# Patient Record
Sex: FEMALE | Race: WHITE | Employment: UNEMPLOYED | URBAN - METROPOLITAN AREA
[De-identification: names, ages, dates, MRNs, and addresses within clinical notes are randomized per-mention and may not be internally consistent; named-entity substitution may affect disease eponyms.]

---

## 2017-08-14 ENCOUNTER — TELEPHONE (OUTPATIENT)
Dept: OBGYN CLINIC | Age: 32
End: 2017-08-14

## 2017-08-15 ENCOUNTER — TELEPHONE (OUTPATIENT)
Dept: OBGYN CLINIC | Age: 32
End: 2017-08-15

## 2019-01-03 ENCOUNTER — ANESTHESIA (OUTPATIENT)
Dept: LABOR AND DELIVERY | Age: 34
End: 2019-01-03
Payer: COMMERCIAL

## 2019-01-03 ENCOUNTER — HOSPITAL ENCOUNTER (INPATIENT)
Age: 34
LOS: 1 days | Discharge: HOME | End: 2019-01-04
Attending: OBSTETRICS & GYNECOLOGY | Admitting: OBSTETRICS & GYNECOLOGY
Payer: COMMERCIAL

## 2019-01-03 ENCOUNTER — ANESTHESIA EVENT (OUTPATIENT)
Dept: LABOR AND DELIVERY | Age: 34
End: 2019-01-03
Payer: COMMERCIAL

## 2019-01-03 PROBLEM — Z37.9 NORMAL LABOR: Status: ACTIVE | Noted: 2019-01-03

## 2019-01-03 LAB
AMPHETAMINE SCREEN, URINE: NORMAL
BARBITURATE SCREEN URINE: NORMAL
BASOPHILS ABSOLUTE: 0.1 K/UL (ref 0–0.2)
BASOPHILS RELATIVE PERCENT: 0.5 %
BENZODIAZEPINE SCREEN, URINE: NORMAL
BUPRENORPHINE URINE: NORMAL
CANNABINOID SCREEN URINE: NORMAL
COCAINE METABOLITE SCREEN URINE: NORMAL
EOSINOPHILS ABSOLUTE: 0.1 K/UL (ref 0–0.6)
EOSINOPHILS RELATIVE PERCENT: 0.8 %
HCT VFR BLD CALC: 38.9 % (ref 36–48)
HEMOGLOBIN: 13.3 G/DL (ref 12–16)
LYMPHOCYTES ABSOLUTE: 2.6 K/UL (ref 1–5.1)
LYMPHOCYTES RELATIVE PERCENT: 17.8 %
Lab: NORMAL
MCH RBC QN AUTO: 31.7 PG (ref 26–34)
MCHC RBC AUTO-ENTMCNC: 34.3 G/DL (ref 31–36)
MCV RBC AUTO: 92.4 FL (ref 80–100)
METHADONE SCREEN, URINE: NORMAL
MONOCYTES ABSOLUTE: 0.8 K/UL (ref 0–1.3)
MONOCYTES RELATIVE PERCENT: 5.6 %
NEUTROPHILS ABSOLUTE: 10.9 K/UL (ref 1.7–7.7)
NEUTROPHILS RELATIVE PERCENT: 75.3 %
OPIATE SCREEN URINE: NORMAL
OXYCODONE URINE: NORMAL
PDW BLD-RTO: 13.2 % (ref 12.4–15.4)
PH UA: 7
PHENCYCLIDINE SCREEN URINE: NORMAL
PLATELET # BLD: 150 K/UL (ref 135–450)
PMV BLD AUTO: 9.4 FL (ref 5–10.5)
PROPOXYPHENE SCREEN: NORMAL
RBC # BLD: 4.22 M/UL (ref 4–5.2)
SPECIMEN STATUS: NORMAL
TOTAL SYPHILLIS IGG/IGM: NORMAL
WBC # BLD: 14.4 K/UL (ref 4–11)

## 2019-01-03 PROCEDURE — 51701 INSERT BLADDER CATHETER: CPT

## 2019-01-03 PROCEDURE — 1220000000 HC SEMI PRIVATE OB R&B

## 2019-01-03 PROCEDURE — 3700000025 ANESTHESIA EPIDURAL BLOCK: Performed by: ANESTHESIOLOGY

## 2019-01-03 PROCEDURE — 6370000000 HC RX 637 (ALT 250 FOR IP): Performed by: ADVANCED PRACTICE MIDWIFE

## 2019-01-03 PROCEDURE — 7200000001 HC VAGINAL DELIVERY

## 2019-01-03 PROCEDURE — 85025 COMPLETE CBC W/AUTO DIFF WBC: CPT

## 2019-01-03 PROCEDURE — 2580000003 HC RX 258: Performed by: ADVANCED PRACTICE MIDWIFE

## 2019-01-03 PROCEDURE — 2500000003 HC RX 250 WO HCPCS: Performed by: NURSE ANESTHETIST, CERTIFIED REGISTERED

## 2019-01-03 PROCEDURE — 86780 TREPONEMA PALLIDUM: CPT

## 2019-01-03 PROCEDURE — 36592 COLLECT BLOOD FROM PICC: CPT

## 2019-01-03 PROCEDURE — 80307 DRUG TEST PRSMV CHEM ANLYZR: CPT

## 2019-01-03 RX ORDER — LANOLIN 100 %
OINTMENT (GRAM) TOPICAL PRN
Status: DISCONTINUED | OUTPATIENT
Start: 2019-01-03 | End: 2019-01-04 | Stop reason: HOSPADM

## 2019-01-03 RX ORDER — DOCUSATE SODIUM 100 MG/1
100 CAPSULE, LIQUID FILLED ORAL 2 TIMES DAILY
Status: DISCONTINUED | OUTPATIENT
Start: 2019-01-03 | End: 2019-01-04 | Stop reason: HOSPADM

## 2019-01-03 RX ORDER — ACETAMINOPHEN 325 MG/1
650 TABLET ORAL EVERY 4 HOURS PRN
Status: DISCONTINUED | OUTPATIENT
Start: 2019-01-03 | End: 2019-01-04 | Stop reason: HOSPADM

## 2019-01-03 RX ORDER — SODIUM CHLORIDE, SODIUM LACTATE, POTASSIUM CHLORIDE, CALCIUM CHLORIDE 600; 310; 30; 20 MG/100ML; MG/100ML; MG/100ML; MG/100ML
INJECTION, SOLUTION INTRAVENOUS CONTINUOUS
Status: DISCONTINUED | OUTPATIENT
Start: 2019-01-03 | End: 2019-01-04 | Stop reason: HOSPADM

## 2019-01-03 RX ORDER — SODIUM CHLORIDE 0.9 % (FLUSH) 0.9 %
10 SYRINGE (ML) INJECTION PRN
Status: DISCONTINUED | OUTPATIENT
Start: 2019-01-03 | End: 2019-01-03 | Stop reason: SDUPTHER

## 2019-01-03 RX ORDER — HYDROCODONE BITARTRATE AND ACETAMINOPHEN 5; 325 MG/1; MG/1
1 TABLET ORAL EVERY 4 HOURS PRN
Status: DISCONTINUED | OUTPATIENT
Start: 2019-01-03 | End: 2019-01-04 | Stop reason: HOSPADM

## 2019-01-03 RX ORDER — SODIUM CHLORIDE 0.9 % (FLUSH) 0.9 %
10 SYRINGE (ML) INJECTION EVERY 12 HOURS SCHEDULED
Status: DISCONTINUED | OUTPATIENT
Start: 2019-01-03 | End: 2019-01-04 | Stop reason: HOSPADM

## 2019-01-03 RX ORDER — DOCUSATE SODIUM 100 MG/1
100 CAPSULE, LIQUID FILLED ORAL 2 TIMES DAILY
Status: DISCONTINUED | OUTPATIENT
Start: 2019-01-03 | End: 2019-01-03 | Stop reason: SDUPTHER

## 2019-01-03 RX ORDER — IBUPROFEN 800 MG/1
800 TABLET ORAL EVERY 8 HOURS
Status: DISCONTINUED | OUTPATIENT
Start: 2019-01-03 | End: 2019-01-04 | Stop reason: HOSPADM

## 2019-01-03 RX ORDER — BUPIVACAINE HYDROCHLORIDE 2.5 MG/ML
INJECTION, SOLUTION EPIDURAL; INFILTRATION; INTRACAUDAL PRN
Status: DISCONTINUED | OUTPATIENT
Start: 2019-01-03 | End: 2019-01-03 | Stop reason: SDUPTHER

## 2019-01-03 RX ORDER — SODIUM CHLORIDE, SODIUM LACTATE, POTASSIUM CHLORIDE, CALCIUM CHLORIDE 600; 310; 30; 20 MG/100ML; MG/100ML; MG/100ML; MG/100ML
INJECTION, SOLUTION INTRAVENOUS CONTINUOUS
Status: DISCONTINUED | OUTPATIENT
Start: 2019-01-03 | End: 2019-01-03 | Stop reason: SDUPTHER

## 2019-01-03 RX ORDER — SODIUM CHLORIDE 0.9 % (FLUSH) 0.9 %
10 SYRINGE (ML) INJECTION EVERY 12 HOURS SCHEDULED
Status: DISCONTINUED | OUTPATIENT
Start: 2019-01-03 | End: 2019-01-03 | Stop reason: SDUPTHER

## 2019-01-03 RX ORDER — ONDANSETRON 2 MG/ML
4 INJECTION INTRAMUSCULAR; INTRAVENOUS EVERY 6 HOURS PRN
Status: DISCONTINUED | OUTPATIENT
Start: 2019-01-03 | End: 2019-01-04 | Stop reason: HOSPADM

## 2019-01-03 RX ORDER — SODIUM CHLORIDE, SODIUM LACTATE, POTASSIUM CHLORIDE, CALCIUM CHLORIDE 600; 310; 30; 20 MG/100ML; MG/100ML; MG/100ML; MG/100ML
INJECTION, SOLUTION INTRAVENOUS
Status: DISPENSED
Start: 2019-01-03 | End: 2019-01-03

## 2019-01-03 RX ORDER — SODIUM CHLORIDE 0.9 % (FLUSH) 0.9 %
10 SYRINGE (ML) INJECTION PRN
Status: DISCONTINUED | OUTPATIENT
Start: 2019-01-03 | End: 2019-01-04 | Stop reason: HOSPADM

## 2019-01-03 RX ADMIN — DOCUSATE SODIUM 100 MG: 100 TABLET, FILM COATED ORAL at 07:58

## 2019-01-03 RX ADMIN — Medication 10 ML: at 20:59

## 2019-01-03 RX ADMIN — SODIUM CHLORIDE, POTASSIUM CHLORIDE, SODIUM LACTATE AND CALCIUM CHLORIDE: 600; 310; 30; 20 INJECTION, SOLUTION INTRAVENOUS at 04:03

## 2019-01-03 RX ADMIN — SODIUM CHLORIDE, POTASSIUM CHLORIDE, SODIUM LACTATE AND CALCIUM CHLORIDE: 600; 310; 30; 20 INJECTION, SOLUTION INTRAVENOUS at 04:30

## 2019-01-03 RX ADMIN — MOXIFLOXACIN HYDROCHLORIDE 800 MG: 400 TABLET, FILM COATED ORAL at 16:05

## 2019-01-03 RX ADMIN — MOXIFLOXACIN HYDROCHLORIDE 800 MG: 400 TABLET, FILM COATED ORAL at 07:59

## 2019-01-03 RX ADMIN — BUPIVACAINE HYDROCHLORIDE 5 ML: 2.5 INJECTION, SOLUTION EPIDURAL; INFILTRATION; INTRACAUDAL; PERINEURAL at 03:45

## 2019-01-03 RX ADMIN — DOCUSATE SODIUM 100 MG: 100 TABLET, FILM COATED ORAL at 20:58

## 2019-01-03 RX ADMIN — HYDROCODONE BITARTRATE AND ACETAMINOPHEN 1 TABLET: 5; 325 TABLET ORAL at 14:40

## 2019-01-03 RX ADMIN — Medication 15 ML/HR: at 03:48

## 2019-01-03 RX ADMIN — WITCH HAZEL: 500 SOLUTION RECTAL; TOPICAL at 07:59

## 2019-01-03 RX ADMIN — SODIUM CHLORIDE, POTASSIUM CHLORIDE, SODIUM LACTATE AND CALCIUM CHLORIDE: 600; 310; 30; 20 INJECTION, SOLUTION INTRAVENOUS at 03:15

## 2019-01-03 RX ADMIN — BENZOCAINE AND MENTHOL: 20; .5 SPRAY TOPICAL at 07:57

## 2019-01-03 ASSESSMENT — PAIN SCALES - GENERAL
PAINLEVEL_OUTOF10: 1
PAINLEVEL_OUTOF10: 4
PAINLEVEL_OUTOF10: 1

## 2019-01-03 ASSESSMENT — PAIN - FUNCTIONAL ASSESSMENT: PAIN_FUNCTIONAL_ASSESSMENT: 0-10

## 2019-01-04 VITALS
WEIGHT: 190 LBS | SYSTOLIC BLOOD PRESSURE: 108 MMHG | HEIGHT: 69 IN | DIASTOLIC BLOOD PRESSURE: 67 MMHG | TEMPERATURE: 98 F | RESPIRATION RATE: 18 BRPM | HEART RATE: 65 BPM | BODY MASS INDEX: 28.14 KG/M2

## 2019-01-04 PROBLEM — Z37.9 NORMAL LABOR: Status: RESOLVED | Noted: 2019-01-03 | Resolved: 2019-01-04

## 2019-01-04 LAB
HCT VFR BLD CALC: 39.1 % (ref 36–48)
HEMOGLOBIN: 13.1 G/DL (ref 12–16)
MCH RBC QN AUTO: 31.8 PG (ref 26–34)
MCHC RBC AUTO-ENTMCNC: 33.4 G/DL (ref 31–36)
MCV RBC AUTO: 95.1 FL (ref 80–100)
PDW BLD-RTO: 13.9 % (ref 12.4–15.4)
PLATELET # BLD: 136 K/UL (ref 135–450)
PMV BLD AUTO: 9.6 FL (ref 5–10.5)
RBC # BLD: 4.12 M/UL (ref 4–5.2)
WBC # BLD: 13.6 K/UL (ref 4–11)

## 2019-01-04 PROCEDURE — 6370000000 HC RX 637 (ALT 250 FOR IP): Performed by: ADVANCED PRACTICE MIDWIFE

## 2019-01-04 PROCEDURE — 36415 COLL VENOUS BLD VENIPUNCTURE: CPT

## 2019-01-04 PROCEDURE — 85027 COMPLETE CBC AUTOMATED: CPT

## 2019-01-04 RX ORDER — IBUPROFEN 800 MG/1
800 TABLET ORAL EVERY 8 HOURS
Refills: 3 | Status: ON HOLD | COMMUNITY
Start: 2019-01-04 | End: 2021-07-10

## 2019-01-04 RX ORDER — PSEUDOEPHEDRINE HCL 30 MG
100 TABLET ORAL 2 TIMES DAILY PRN
Status: ON HOLD | COMMUNITY
Start: 2019-01-04 | End: 2021-07-10

## 2019-01-04 RX ADMIN — MOXIFLOXACIN HYDROCHLORIDE 800 MG: 400 TABLET, FILM COATED ORAL at 00:05

## 2019-01-04 RX ADMIN — HYDROCODONE BITARTRATE AND ACETAMINOPHEN 1 TABLET: 5; 325 TABLET ORAL at 07:04

## 2019-01-04 RX ADMIN — DOCUSATE SODIUM 100 MG: 100 TABLET, FILM COATED ORAL at 08:46

## 2019-01-04 RX ADMIN — HYDROCODONE BITARTRATE AND ACETAMINOPHEN 1 TABLET: 5; 325 TABLET ORAL at 01:10

## 2019-01-04 RX ADMIN — MOXIFLOXACIN HYDROCHLORIDE 800 MG: 400 TABLET, FILM COATED ORAL at 08:45

## 2019-01-04 ASSESSMENT — PAIN SCALES - GENERAL
PAINLEVEL_OUTOF10: 6
PAINLEVEL_OUTOF10: 5
PAINLEVEL_OUTOF10: 3
PAINLEVEL_OUTOF10: 5

## 2020-12-09 LAB
C. TRACHOMATIS, EXTERNAL RESULT: NEGATIVE
N. GONORRHOEAE, EXTERNAL RESULT: NEGATIVE

## 2021-01-06 LAB
ABO, EXTERNAL RESULT: NORMAL
HEP B, EXTERNAL RESULT: NEGATIVE
HIV, EXTERNAL RESULT: NON REACTIVE
RH FACTOR, EXTERNAL RESULT: POSITIVE
RPR, EXTERNAL RESULT: NON REACTIVE
RUBELLA TITER, EXTERNAL RESULT: NORMAL

## 2021-07-08 LAB — GBS, EXTERNAL RESULT: NEGATIVE

## 2021-07-10 ENCOUNTER — HOSPITAL ENCOUNTER (OUTPATIENT)
Age: 36
Discharge: HOME | End: 2021-07-10
Attending: OBSTETRICS & GYNECOLOGY | Admitting: OBSTETRICS & GYNECOLOGY
Payer: COMMERCIAL

## 2021-07-10 VITALS
WEIGHT: 186 LBS | BODY MASS INDEX: 27.55 KG/M2 | SYSTOLIC BLOOD PRESSURE: 128 MMHG | HEIGHT: 69 IN | TEMPERATURE: 97.7 F | DIASTOLIC BLOOD PRESSURE: 74 MMHG | HEART RATE: 64 BPM | RESPIRATION RATE: 18 BRPM

## 2021-07-10 PROCEDURE — 99211 OFF/OP EST MAY X REQ PHY/QHP: CPT

## 2021-07-10 RX ORDER — ACETAMINOPHEN/DIPHENHYDRAMINE 500MG-25MG
2 TABLET ORAL NIGHTLY PRN
COMMUNITY

## 2021-07-10 NOTE — FLOWSHEET NOTE
S Solares CNM in to see pt. SVE unchanged from office visit yesterday. S Solares CNM demonstrated to pt some positions for comfort, which pt states did ease her back and pelvic discomfort sllightly. Pt to go home and keep next appt as scheduled.

## 2021-07-10 NOTE — FLOWSHEET NOTE
Discharge instructions reviewed with pt who voices understanding of same. Copy of same to pt. She is discharged now in good condition accompanied by her .

## 2021-07-10 NOTE — H&P
Department of Obstetrics and Gynecology  Labor and Delivery Triage Note        CHIEF COMPLAINT:  Low back pain    HISTORY OF PRESENT ILLNESS:      The patient is a 38 y.o. female 37w1d. OB History        4    Para   2    Term   2       0    AB   1    Living   2       SAB   1    TAB   0    Ectopic   0    Molar   0    Multiple   0    Live Births   2              Patient presents with a chief complaint as above. Low back pain and sacral pressure started yesterday and was seen in the office. Cx /-3. Pain continued overnight and worried it might be back labor. Denies vb, lof or ctxs. Baby active. No other c/o. Denies n/v/d. Denies urinary symptoms. Estimated Due Date:  Estimated Date of Delivery: 21    PAST MEDICAL HISTORY:   Past Medical History:   Diagnosis Date    Abnormal Pap smear of cervix     Asthma     childhood       PAST  SURGICAL HISTORY:   Past Surgical History:   Procedure Laterality Date    COLPOSCOPY  2015    WISDOM TOOTH EXTRACTION         SOCIAL HISTORY:     reports that she has never smoked. She has never used smokeless tobacco. She reports that she does not drink alcohol and does not use drugs. MEDICATIONS:    Prior to Admission medications    Medication Sig Start Date End Date Taking? Authorizing Provider   diphenhydrAMINE-APAP, sleep, (TYLENOL PM EXTRA STRENGTH)  MG tablet Take 2 tablets by mouth nightly as needed for Sleep   Yes Historical Provider, MD   Prenatal Multivit-Min-Fe-FA (PRE- PO) Take by mouth   Yes Historical Provider, MD        PRENATAL CARE:    Complicated by: none    REVIEW OF SYSTEMS:     Pertinent items are noted in HPI. PHYSICAL EXAM:    Vital Signs: Blood pressure 128/74, pulse 64, temperature 97.7 °F (36.5 °C), resp. rate 18, height 5' 9\" (1.753 m), weight 186 lb (84.4 kg), unknown if currently breastfeeding. Abdomen soft, non tender, consistent with her EGA.     Fetal heart rate:  Baseline Heart Rate 125, accelerations:  present  long term variability:  moderate  Cervix:  1 cm 50% medium -3    Contraction frequency:  No ctxs visible on toco, uterus soft    Membranes:  Intact    General Labs:  None sent    ASSESSMENT:    IUP @ 37w1d with low back pain. No cervical change from office visit yesterday  Reactive NST  Patient Active Problem List   Diagnosis    Vaginal delivery          PLAN:  Disposition:  Patient discharged home and instructed on symptoms of labor, rupture of membranes, vaginal bleeding, fetal movement and fever  F/U Instructions: as scheduled next week in the office or prn.      FERCHO Velazquez CNM  7/10/2021

## 2021-07-25 ENCOUNTER — ANESTHESIA (OUTPATIENT)
Dept: LABOR AND DELIVERY | Age: 36
End: 2021-07-25
Payer: COMMERCIAL

## 2021-07-25 ENCOUNTER — ANESTHESIA EVENT (OUTPATIENT)
Dept: LABOR AND DELIVERY | Age: 36
End: 2021-07-25
Payer: COMMERCIAL

## 2021-07-25 ENCOUNTER — HOSPITAL ENCOUNTER (INPATIENT)
Age: 36
LOS: 1 days | Discharge: HOME | End: 2021-07-26
Attending: OBSTETRICS & GYNECOLOGY | Admitting: OBSTETRICS & GYNECOLOGY
Payer: COMMERCIAL

## 2021-07-25 PROBLEM — Z37.9 NORMAL LABOR: Status: ACTIVE | Noted: 2021-07-25

## 2021-07-25 PROBLEM — O42.92 FULL-TERM PREMATURE RUPTURE OF MEMBRANES: Status: ACTIVE | Noted: 2021-07-25

## 2021-07-25 LAB
ABO/RH: NORMAL
AMPHETAMINE SCREEN, URINE: NORMAL
ANTIBODY SCREEN: NORMAL
BARBITURATE SCREEN URINE: NORMAL
BASOPHILS ABSOLUTE: 0.1 K/UL (ref 0–0.2)
BASOPHILS RELATIVE PERCENT: 0.7 %
BENZODIAZEPINE SCREEN, URINE: NORMAL
BUPRENORPHINE URINE: NORMAL
CANNABINOID SCREEN URINE: NORMAL
COCAINE METABOLITE SCREEN URINE: NORMAL
EOSINOPHILS ABSOLUTE: 0.1 K/UL (ref 0–0.6)
EOSINOPHILS RELATIVE PERCENT: 0.7 %
HCT VFR BLD CALC: 36.2 % (ref 36–48)
HEMOGLOBIN: 12.8 G/DL (ref 12–16)
LYMPHOCYTES ABSOLUTE: 1.6 K/UL (ref 1–5.1)
LYMPHOCYTES RELATIVE PERCENT: 12.7 %
Lab: NORMAL
MCH RBC QN AUTO: 31.9 PG (ref 26–34)
MCHC RBC AUTO-ENTMCNC: 35.3 G/DL (ref 31–36)
MCV RBC AUTO: 90.4 FL (ref 80–100)
METHADONE SCREEN, URINE: NORMAL
MONOCYTES ABSOLUTE: 0.9 K/UL (ref 0–1.3)
MONOCYTES RELATIVE PERCENT: 7 %
NEUTROPHILS ABSOLUTE: 9.7 K/UL (ref 1.7–7.7)
NEUTROPHILS RELATIVE PERCENT: 78.9 %
OPIATE SCREEN URINE: NORMAL
OXYCODONE URINE: NORMAL
PDW BLD-RTO: 13.2 % (ref 12.4–15.4)
PH UA: 6
PHENCYCLIDINE SCREEN URINE: NORMAL
PLATELET # BLD: 136 K/UL (ref 135–450)
PMV BLD AUTO: 9 FL (ref 5–10.5)
PROPOXYPHENE SCREEN: NORMAL
RBC # BLD: 4 M/UL (ref 4–5.2)
WBC # BLD: 12.3 K/UL (ref 4–11)

## 2021-07-25 PROCEDURE — 51701 INSERT BLADDER CATHETER: CPT

## 2021-07-25 PROCEDURE — 85025 COMPLETE CBC W/AUTO DIFF WBC: CPT

## 2021-07-25 PROCEDURE — 80307 DRUG TEST PRSMV CHEM ANLYZR: CPT

## 2021-07-25 PROCEDURE — 86901 BLOOD TYPING SEROLOGIC RH(D): CPT

## 2021-07-25 PROCEDURE — 3700000025 EPIDURAL BLOCK: Performed by: ANESTHESIOLOGY

## 2021-07-25 PROCEDURE — 1220000000 HC SEMI PRIVATE OB R&B

## 2021-07-25 PROCEDURE — 2500000003 HC RX 250 WO HCPCS: Performed by: NURSE ANESTHETIST, CERTIFIED REGISTERED

## 2021-07-25 PROCEDURE — 2580000003 HC RX 258: Performed by: OBSTETRICS & GYNECOLOGY

## 2021-07-25 PROCEDURE — 86900 BLOOD TYPING SEROLOGIC ABO: CPT

## 2021-07-25 PROCEDURE — 6360000002 HC RX W HCPCS: Performed by: OBSTETRICS & GYNECOLOGY

## 2021-07-25 PROCEDURE — 7200000001 HC VAGINAL DELIVERY

## 2021-07-25 PROCEDURE — 6370000000 HC RX 637 (ALT 250 FOR IP): Performed by: OBSTETRICS & GYNECOLOGY

## 2021-07-25 PROCEDURE — 86780 TREPONEMA PALLIDUM: CPT

## 2021-07-25 PROCEDURE — 10907ZC DRAINAGE OF AMNIOTIC FLUID, THERAPEUTIC FROM PRODUCTS OF CONCEPTION, VIA NATURAL OR ARTIFICIAL OPENING: ICD-10-PCS | Performed by: OBSTETRICS & GYNECOLOGY

## 2021-07-25 PROCEDURE — 86850 RBC ANTIBODY SCREEN: CPT

## 2021-07-25 RX ORDER — SIMETHICONE 80 MG
80 TABLET,CHEWABLE ORAL EVERY 6 HOURS PRN
Status: DISCONTINUED | OUTPATIENT
Start: 2021-07-25 | End: 2021-07-26 | Stop reason: HOSPADM

## 2021-07-25 RX ORDER — ONDANSETRON 2 MG/ML
4 INJECTION INTRAMUSCULAR; INTRAVENOUS EVERY 6 HOURS PRN
Status: DISCONTINUED | OUTPATIENT
Start: 2021-07-25 | End: 2021-07-25

## 2021-07-25 RX ORDER — LANOLIN 100 %
OINTMENT (GRAM) TOPICAL PRN
Status: DISCONTINUED | OUTPATIENT
Start: 2021-07-25 | End: 2021-07-26 | Stop reason: HOSPADM

## 2021-07-25 RX ORDER — ONDANSETRON 2 MG/ML
4 INJECTION INTRAMUSCULAR; INTRAVENOUS EVERY 6 HOURS PRN
Status: DISCONTINUED | OUTPATIENT
Start: 2021-07-25 | End: 2021-07-26 | Stop reason: HOSPADM

## 2021-07-25 RX ORDER — ACETAMINOPHEN 325 MG/1
650 TABLET ORAL EVERY 4 HOURS PRN
Status: DISCONTINUED | OUTPATIENT
Start: 2021-07-25 | End: 2021-07-26 | Stop reason: HOSPADM

## 2021-07-25 RX ORDER — DOCUSATE SODIUM 100 MG/1
100 CAPSULE, LIQUID FILLED ORAL 2 TIMES DAILY PRN
Status: DISCONTINUED | OUTPATIENT
Start: 2021-07-25 | End: 2021-07-26 | Stop reason: HOSPADM

## 2021-07-25 RX ORDER — SODIUM CHLORIDE 0.9 % (FLUSH) 0.9 %
5-40 SYRINGE (ML) INJECTION EVERY 12 HOURS SCHEDULED
Status: DISCONTINUED | OUTPATIENT
Start: 2021-07-25 | End: 2021-07-26 | Stop reason: HOSPADM

## 2021-07-25 RX ORDER — SODIUM CHLORIDE 9 MG/ML
25 INJECTION, SOLUTION INTRAVENOUS PRN
Status: DISCONTINUED | OUTPATIENT
Start: 2021-07-25 | End: 2021-07-26 | Stop reason: HOSPADM

## 2021-07-25 RX ORDER — SODIUM CHLORIDE, SODIUM LACTATE, POTASSIUM CHLORIDE, AND CALCIUM CHLORIDE .6; .31; .03; .02 G/100ML; G/100ML; G/100ML; G/100ML
500 INJECTION, SOLUTION INTRAVENOUS PRN
Status: DISCONTINUED | OUTPATIENT
Start: 2021-07-25 | End: 2021-07-26 | Stop reason: HOSPADM

## 2021-07-25 RX ORDER — BUPIVACAINE HYDROCHLORIDE 2.5 MG/ML
INJECTION, SOLUTION EPIDURAL; INFILTRATION; INTRACAUDAL PRN
Status: DISCONTINUED | OUTPATIENT
Start: 2021-07-25 | End: 2021-07-25 | Stop reason: SDUPTHER

## 2021-07-25 RX ORDER — SODIUM CHLORIDE, SODIUM LACTATE, POTASSIUM CHLORIDE, AND CALCIUM CHLORIDE .6; .31; .03; .02 G/100ML; G/100ML; G/100ML; G/100ML
1000 INJECTION, SOLUTION INTRAVENOUS PRN
Status: DISCONTINUED | OUTPATIENT
Start: 2021-07-25 | End: 2021-07-26 | Stop reason: HOSPADM

## 2021-07-25 RX ORDER — HYDROCODONE BITARTRATE AND ACETAMINOPHEN 5; 325 MG/1; MG/1
2 TABLET ORAL EVERY 4 HOURS PRN
Status: DISCONTINUED | OUTPATIENT
Start: 2021-07-25 | End: 2021-07-26 | Stop reason: HOSPADM

## 2021-07-25 RX ORDER — SODIUM CHLORIDE 0.9 % (FLUSH) 0.9 %
5-40 SYRINGE (ML) INJECTION PRN
Status: DISCONTINUED | OUTPATIENT
Start: 2021-07-25 | End: 2021-07-26 | Stop reason: HOSPADM

## 2021-07-25 RX ORDER — DOCUSATE SODIUM 100 MG/1
100 CAPSULE, LIQUID FILLED ORAL 2 TIMES DAILY
Status: DISCONTINUED | OUTPATIENT
Start: 2021-07-25 | End: 2021-07-26 | Stop reason: HOSPADM

## 2021-07-25 RX ORDER — HYDROCODONE BITARTRATE AND ACETAMINOPHEN 5; 325 MG/1; MG/1
1 TABLET ORAL EVERY 4 HOURS PRN
Status: DISCONTINUED | OUTPATIENT
Start: 2021-07-25 | End: 2021-07-26 | Stop reason: HOSPADM

## 2021-07-25 RX ORDER — SODIUM CHLORIDE, SODIUM LACTATE, POTASSIUM CHLORIDE, CALCIUM CHLORIDE 600; 310; 30; 20 MG/100ML; MG/100ML; MG/100ML; MG/100ML
INJECTION, SOLUTION INTRAVENOUS CONTINUOUS
Status: DISCONTINUED | OUTPATIENT
Start: 2021-07-25 | End: 2021-07-26 | Stop reason: HOSPADM

## 2021-07-25 RX ORDER — IBUPROFEN 800 MG/1
800 TABLET ORAL EVERY 6 HOURS PRN
Status: DISCONTINUED | OUTPATIENT
Start: 2021-07-25 | End: 2021-07-26 | Stop reason: HOSPADM

## 2021-07-25 RX ORDER — FERROUS SULFATE 325(65) MG
325 TABLET ORAL 2 TIMES DAILY WITH MEALS
Status: DISCONTINUED | OUTPATIENT
Start: 2021-07-25 | End: 2021-07-26 | Stop reason: HOSPADM

## 2021-07-25 RX ADMIN — Medication 87.3 MILLI-UNITS/MIN: at 14:00

## 2021-07-25 RX ADMIN — IBUPROFEN 800 MG: 800 TABLET, FILM COATED ORAL at 22:58

## 2021-07-25 RX ADMIN — BENZOCAINE AND LEVOMENTHOL: 200; 5 SPRAY TOPICAL at 16:19

## 2021-07-25 RX ADMIN — SODIUM CHLORIDE, POTASSIUM CHLORIDE, SODIUM LACTATE AND CALCIUM CHLORIDE: 600; 310; 30; 20 INJECTION, SOLUTION INTRAVENOUS at 07:43

## 2021-07-25 RX ADMIN — BUPIVACAINE HYDROCHLORIDE 10 ML: 2.5 INJECTION, SOLUTION EPIDURAL; INFILTRATION; INTRACAUDAL; PERINEURAL at 12:42

## 2021-07-25 RX ADMIN — Medication 15 ML/HR: at 10:23

## 2021-07-25 RX ADMIN — DOCUSATE SODIUM 100 MG: 100 CAPSULE ORAL at 16:19

## 2021-07-25 RX ADMIN — IBUPROFEN 800 MG: 800 TABLET, FILM COATED ORAL at 16:19

## 2021-07-25 RX ADMIN — Medication 1 MILLI-UNITS/MIN: at 11:00

## 2021-07-25 RX ADMIN — Medication 40 EACH: at 16:19

## 2021-07-25 RX ADMIN — BUPIVACAINE HYDROCHLORIDE 7 ML: 2.5 INJECTION, SOLUTION EPIDURAL; INFILTRATION; INTRACAUDAL; PERINEURAL at 10:17

## 2021-07-25 ASSESSMENT — PAIN SCALES - GENERAL
PAINLEVEL_OUTOF10: 2
PAINLEVEL_OUTOF10: 0

## 2021-07-25 NOTE — PROGRESS NOTES
Called and updated Cecilia Bradford on pt SVE of 4/80/-1. Pt is rating pain 0/10. Received orders to start Pitocin.

## 2021-07-25 NOTE — H&P
Department of Obstetrics and Gynecology  [facility] Obstetrics History and Physical        CHIEF COMPLAINT:  contractions, leakage of amniotic fluid    HISTORY OF PRESENT ILLNESS:      The patient is a 38 y.o.  5 parity  at 39.2 weeks. Patient presents with a chief complaint as above and is being admitted for active phase labor. Now with epi and comfortable. DATES:    Last Menstrual Period:    Estimated Due Date:      PRENATAL CARE:    Provider:  CHRISTIANO's    Group B Strep:  neg    ACOG rev'd and no pertinent changes noted    PAST OB HISTORY            OB History    Para Term  AB Living   4 2 2 0 1 2   SAB TAB Ectopic Molar Multiple Live Births   1 0 0 0 0 2      # Outcome Date GA Lbr Tomasz/2nd Weight Sex Delivery Anes PTL Lv   4 Current            3 Term 19 39w3d 00:43 / 00:15 9 lb 1.5 oz (4.125 kg) M Vag-Spont EPI N TJ   2 Term 17 39w5d  8 lb 5 oz (3.77 kg) M Vag-Spont None N TJ   1 SAB              Past Gynecological History:      Menarche:    Last menstrual period:  No LMP recorded. Patient is pregnant. History of uterine fibroids:  No  History of endometriosis:  No  Pap History:    Sexually transmitted disease history: none    Past Medical History:        Diagnosis Date    Abnormal Pap smear of cervix     Asthma     childhood     Past Surgical History:        Procedure Laterality Date    COLPOSCOPY  2015    WISDOM TOOTH EXTRACTION       Social History:    TOBACCO:   reports that she has never smoked. She has never used smokeless tobacco.  ETOH:   reports no history of alcohol use. DRUGS:   reports no history of drug use.   Family History:       Problem Relation Age of Onset    Anemia Mother    [employer] Arthritis Mother    [de-identified] / [place] Mother     Arrhythmia Sister     Depression Maternal Grandfather     Diabetes Maternal Grandfather     Stroke Maternal Grandfather     Cancer Paternal Grandmother      Medications Prior to Admission:  Medications Prior to Admission: Prenatal Multivit-Min-Fe-FA (PRE- PO), Take by mouth  diphenhydrAMINE-APAP, sleep, (TYLENOL PM EXTRA STRENGTH)  MG tablet, Take 2 tablets by mouth nightly as needed for Sleep  Allergies:  Patient has no known allergies. REVIEW OF SYSTEMS:    Patient has no history of depression.   Patient has no symptoms of depression  CONSTITUTIONAL:  negative  HEENT:  negative  RESPIRATORY:  negative  GASTROINTESTINAL:  negative  GENITOURINARY:  negative  MUSCULOSKELETAL:  negative  BEHAVIOR/PSYCH:  negative    PHYSICAL EXAM:    General appearance:  awake, alert, cooperative, no apparent distress, and appears stated age  Neurologic:  deferred  Lungs:  No increased work of breathing, good air exchange, clear to auscultation bilaterally, no crackles or wheezing  Heart:  Normal apical impulse, regular rate and rhythm, normal S1 and S2, no S3 or S4, and no murmur noted  Abdomen:  Soft, NT, gravid, EFW 8 1/2#  Fetal heart rate:  Baseline Heart Rate 120, accelerations:  present  long term variability:  moderate  decelerations:  absent  Pelvis:    Cervix:    deferred  Contraction frequency:  3 minutes  Membranes:  Ruptured clear fluid    Pelvic Ultrasound:      General Labs:    CBC:   Lab Results   Component Value Date    WBC 12.3 2021    RBC 4.00 2021    HGB 12.8 2021    HCT 36.2 2021    MCV 90.4 2021    RDW 13.2 2021     2021       ASSESSMENT AND PLAN:    The patient is a 38 y.o.  5 parity 2 at 39.2 weeks  FHR cat 1  Active labor  GBS neg    P: admitted with routine labor orders  Pitocin aug after epidural  Dr. Eric Gray will be notified       Active Problems:    Full-term premature rupture of membranes  Plan:     Normal labor  Plan:

## 2021-07-25 NOTE — FLOWSHEET NOTE
Pt asks that I removed PIV HL from left hand. She states it is positional, red and painful. Teaching done about why we keep IV sites in placed and that she will be getting H&H lab draw in morning. She asks that I remove it any ways. States she \"will let them start a new IV tomorrow\" if she needs one. Vaginal bleeding WNL, able to void. EBL was ~100 mL. Admitting H&H 12.8/36.2    PIV removed at this time.

## 2021-07-25 NOTE — ANESTHESIA PRE PROCEDURE
Department of Anesthesiology  Preprocedure Note       Name:  Kanika Plascencia   Age:  38 y.o.  :  1985                                          MRN:  [id number]         Date:  2021      Surgeon: * No surgeons listed *    Procedure: * No procedures listed *    Medications prior to admission:   Prior to Admission medications    Medication Sig Start Date End Date Taking?  Authorizing Provider   Prenatal Multivit-Min-Fe-FA (PRE- PO) Take by mouth   Yes Historical Provider, MD   diphenhydrAMINE-APAP, sleep, (TYLENOL PM EXTRA STRENGTH)  MG tablet Take 2 tablets by mouth nightly as needed for Sleep    Historical Provider, MD       Current medications:    Current Facility-Administered Medications   Medication Dose Route Frequency Provider Last Rate Last Admin    lactated ringers infusion   Intravenous Continuous , APRN -  mL/hr at 21 0927 Rate Verify at 21 5476    lactated ringers bolus  500 mL Intravenous PRN , APRN - CNM        Or    lactated ringers bolus  1,000 mL Intravenous PRN , APRN - CNM        sodium chloride flush 0.9 % injection 5-40 mL  5-40 mL Intravenous 2 times per day , APRN - CNM        sodium chloride flush 0.9 % injection 5-40 mL  5-40 mL Intravenous PRN , APRN - CNM        0.9 % sodium chloride infusion  25 mL Intravenous PRN , APRN - CNM        ondansetron [facility]) injection 4 mg  4 mg Intravenous Q6H PRN , APRN - CNM        acetaminophen (TYLENOL) tablet 650 mg  650 mg Oral Q4H PRN , APRN - CNM        benzocaine-menthol (DERMOPLAST) 20-0.5 % spray   Topical PRN , APRN - CNM        docusate sodium (COLACE) capsule 100 mg  100 mg Oral BID , APRN - CNM        oxytocin (PITOCIN) 10 unit bolus from the bag  10 Units Intravenous PRN , APRN - CNM        And    oxytocin (PITOCIN) 30 units in 500 mL infusion  87.3 jaxon-units/min Intravenous Continuous PRN FERCHO Jones - CNTIGIST         Facility-Administered Medications Ordered in Other Encounters   Medication Dose Route Frequency Provider Last Rate Last Admin    bupivacaine (PF) (MARCAINE) 0.25 % injection   Epidural PRN FERCHO Horn CRNA   7 mL at 07/25/21 1017    sodium chloride 0.9 % 200 mL with fentaNYL 500 mcg, bupivacaine 0.5% 50 mL (OB) epidural   Epidural Continuous PRN FERCHO Horn CRNA 15 mL/hr at 07/25/21 1023 15 mL/hr at 07/25/21 1023       Allergies:  No Known Allergies    Problem List:    Patient Active Problem List   Diagnosis Code    Vaginal delivery O80    Full-term premature rupture of membranes O42.92       Past Medical History:        Diagnosis Date    Abnormal Pap smear of cervix     Asthma     childhood       Past Surgical History:        Procedure Laterality Date    COLPOSCOPY  12/07/2015    WISDOM TOOTH EXTRACTION         Social History:    Social History     Tobacco Use    Smoking status: Never Smoker    Smokeless tobacco: Never Used   Substance Use Topics    Alcohol use:  No                                Counseling given: Not Answered      Vital Signs (Current):   Vitals:    07/25/21 1020 07/25/21 1023 07/25/21 1026 07/25/21 1030   BP: 123/70 (!) 122/59 125/61 118/67   Pulse: 76 71 67 71   Resp:    16   Temp:    36.7 °C (98.1 °F)   SpO2:       Weight:       Height:                                                  BP Readings from Last 3 Encounters:   07/25/21 118/67   07/10/21 128/74   01/04/19 108/67       NPO Status: Time of last liquid consumption: 0430                        Time of last solid consumption: 0430                        Date of last liquid consumption: 07/25/21                        Date of last solid food consumption: 07/25/21    BMI:   Wt Readings from Last 3 Encounters:   07/25/21 190 lb (86.2 kg)   07/10/21 186 lb (84.4 kg)   01/03/19 190 lb (86.2 kg)     Body mass index is 28.06 kg/m². CBC:   Lab Results   Component Value Date    WBC 12.3 07/25/2021    RBC 4.00 07/25/2021    HGB 12.8 07/25/2021    HCT 36.2 07/25/2021    MCV 90.4 07/25/2021    RDW 13.2 07/25/2021     07/25/2021       CMP: No results found for: NA, K, CL, CO2, BUN, CREATININE, GFRAA, AGRATIO, LABGLOM, GLUCOSE, PROT, CALCIUM, BILITOT, ALKPHOS, AST, ALT    POC Tests: No results for input(s): POCGLU, POCNA, POCK, POCCL, POCBUN, POCHEMO, POCHCT in the last 72 hours. Coags: No results found for: PROTIME, INR, APTT    HCG (If Applicable): No results found for: PREGTESTUR, PREGSERUM, HCG, HCGQUANT     ABGs: No results found for: PHART, PO2ART, RDE5IOZ, AZN8GMY, BEART, V4BKYTZN     Type & Screen (If Applicable):  No results found for: LABABO, LABRH    Drug/Infectious Status (If Applicable):  No results found for: HIV, HEPCAB    COVID-19 Screening (If Applicable): No results found for: COVID19        Anesthesia Evaluation   no history of anesthetic complications:   Airway: Mallampati: II  TM distance: >3 FB   Neck ROM: full  Mouth opening: > = 3 FB Dental: normal exam         Pulmonary:normal exam    (+) asthma:                            Cardiovascular:Negative CV ROS                      Neuro/Psych:   Negative Neuro/Psych ROS              GI/Hepatic/Renal: Neg GI/Hepatic/Renal ROS            Endo/Other: Negative Endo/Other ROS                    Abdominal:             Vascular: negative vascular ROS. Other Findings:             Anesthesia Plan      epidural     ASA 2 - emergent             Anesthetic plan and risks discussed with patient and spouse. Plan discussed with attending.                   FERCHO Longoria - PARTH   7/25/2021

## 2021-07-25 NOTE — PROGRESS NOTES
Patient arrived to [facility] with c/o SROM at 0400 and contractions since 0530. Escorted to T2, instructed to change into gown and leave a urine sample. Triage admission completed. Patient denies recent SIC, reports pain 4/10 with contractions. Triage admission completed.

## 2021-07-25 NOTE — FLOWSHEET NOTE
Assumed care of patient. Report from MELINA Abdalla RN    Pt reports her legs at still numb/tingling from epidural re-dose right before . POC to try to get her OOB, void and remove epidural discussed. FF at midline. U/-1, small bleeding, no clots. Denies pain.

## 2021-07-25 NOTE — LACTATION NOTE
This note was copied from a baby's chart. Lactation Progress Note      Data:   Initial consult with multip breast feeder who delivered earlier today. Infant has breast fed well since delivery. Action: Introduced self to patient as Lactation RN, name and phone number written on white board in room. Reviewed with mother what to expect over the next  24-48 hours with infant feedings, infant output, and breast care. Reviewed infant feeding cues and encouraged mother to allow infant to breast feed on demand, a minimum of 8-12 times a day after the first day of life. Also encouraged mother to avoid giving infant a pacifier or bottle for at least the first two weeks of life. MOB asking about preventing clogged ducts as she struggled with clogged ducts with her first two babies. MOB feels that she may have had an over supply with her other two children. Encouraged her to feed infant on demand but if she feels she is getting an oversupply to attempt to breast feed from only one side at a feeding to make sure that a breast is fully emptied at least every other feeding. Went over signs from baby that she may have an over supply and discussed pumping no more than an ounce off of the opposite breast baby is not feeding on for comfort if needed but not more than that. Discussed using lecithin if she were to start having a problem with clogged ducts with this baby as well but encouraged her to call outpatient lactation clinic once home with any questions and we could walk her through helping to decrease an oversupply and ways to prevent clogged ducts. All questions answered. Mother instructed to call Lactation nurse for F/U care as needed. During consult, observed infant latch with JI, SRS and AS. Response: Mother appreciative of a review of breast feeding but confident. Will call prn.

## 2021-07-25 NOTE — PROGRESS NOTES
Called Anirudh St CNM to discuss POC. Patient wanting to try a breast pump at this time to promote ctx and to wait to start Pitocin until \"later\". Gisela Post agreeable to this plan.

## 2021-07-25 NOTE — PLAN OF CARE
Problem: Pain:  Description: Pain management should include both nonpharmacologic and pharmacologic interventions.   Goal: Pain level will decrease  Description: Pain level will decrease  7/25/2021 1415 by Shefali Parker RN  Outcome: Completed  7/25/2021 0807 by Shefali Parker RN  Outcome: Ongoing  Goal: Control of acute pain  Description: Control of acute pain  7/25/2021 1415 by Shefali Parker RN  Outcome: Completed  7/25/2021 0807 by Shefali Parker RN  Outcome: Ongoing  Goal: Control of chronic pain  Description: Control of chronic pain  7/25/2021 1415 by Shefali Parker RN  Outcome: Completed  7/25/2021 0807 by Shefali Parker RN  Outcome: Ongoing     Problem: Anxiety:  Goal: Level of anxiety will decrease  Description: Level of anxiety will decrease  7/25/2021 1415 by Shefali Parker RN  Outcome: Completed  7/25/2021 0807 by Shefali Parker RN  Outcome: Ongoing     Problem: Breathing Pattern - Ineffective:  Goal: Able to breathe comfortably  Description: Able to breathe comfortably  7/25/2021 1415 by Shefali Parker RN  Outcome: Completed  7/25/2021 0807 by Shefali Parker RN  Outcome: Ongoing     Problem: Fluid Volume - Imbalance:  Goal: Absence of imbalanced fluid volume signs and symptoms  Description: Absence of imbalanced fluid volume signs and symptoms  7/25/2021 1415 by Shefali Parker RN  Outcome: Completed  7/25/2021 0807 by Shefali Parker RN  Outcome: Ongoing  Goal: Absence of intrapartum hemorrhage signs and symptoms  Description: Absence of intrapartum hemorrhage signs and symptoms  7/25/2021 1415 by Shefali Parker RN  Outcome: Completed  7/25/2021 0807 by Shefali Parker RN  Outcome: Ongoing     Problem: Infection - Intrapartum Infection:  Goal: Will show no infection signs and symptoms  Description: Will show no infection signs and symptoms  7/25/2021 1415 by Shefali Parker RN  Outcome: Completed  7/25/2021 0807 by Juan Lott SYDNIE Abdalla  Outcome: Ongoing     Problem: Labor Process - Prolonged:  Goal: Labor progression, first stage, within specified pattern  Description: Labor progression, first stage, within specified pattern  2021 1415 by Alecia Tracy RN  Outcome: Completed  2021 0807 by Alecia Tracy RN  Outcome: Ongoing  Goal: Labor progession, second stage, within specified pattern  Description: Labor progession, second stage, within specified pattern  2021 1415 by Alecia Tracy RN  Outcome: Completed  2021 0807 by Alecia Tracy RN  Outcome: Ongoing  Goal: Uterine contractions within specified parameters  Description: Uterine contractions within specified parameters  2021 1415 by Alecia Tracy RN  Outcome: Completed  2021 08 by Alecia Tracy RN  Outcome: Ongoing     Problem:  Screening:  Goal: Ability to make informed decisions regarding treatment has improved  Description: Ability to make informed decisions regarding treatment has improved  2021 1415 by Alecia Tracy RN  Outcome: Completed  2021 08 by Alecia Tracy RN  Outcome: Ongoing     Problem: Pain - Acute:  Goal: Pain level will decrease  Description: Pain level will decrease  2021 1415 by Alecia Tracy RN  Outcome: Completed  2021 08 by Alecia Tracy RN  Outcome: Ongoing  Goal: Able to cope with pain  Description: Able to cope with pain  2021 1415 by Alecia Tracy RN  Outcome: Completed  2021 0807 by Alecia Tracy RN  Outcome: Ongoing     Problem: Tissue Perfusion - Uteroplacental, Altered:  Description: [TRUNCATED] For intrapartum patients with recurrent variable decelerations of the fetal heart rate, consider transcervical amnioinfusion. For patients in labor, avoid prophylactic use of continuous maternal oxygen supplementation to prevent nonreassu . ..   Goal: Absence of abnormal fetal heart rate pattern  Description: Absence of abnormal fetal heart rate pattern  7/25/2021 1415 by Mihaela Hammond RN  Outcome: Completed  7/25/2021 0807 by Mihaela Hammond RN  Outcome: Ongoing     Problem: Urinary Retention:  Goal: Experiences of bladder distention will decrease  Description: Experiences of bladder distention will decrease  7/25/2021 1415 by Mihaela Hammond RN  Outcome: Completed  7/25/2021 0807 by Mihaela Hammond RN  Outcome: Ongoing  Goal: Urinary elimination within specified parameters  Description: Urinary elimination within specified parameters  7/25/2021 1415 by Mihaela Hammond RN  Outcome: Completed  7/25/2021 0807 by Mihaela Hammond RN  Outcome: Ongoing     Problem: VAGINAL DELIVERY - RECOVERY AND POST PARTUM  Goal: Vital signs are medically acceptable  Outcome: Ongoing  Goal: Patient will remain free of falls  Outcome: Ongoing  Goal: Fundus firm at midline  Outcome: Ongoing  Goal: Moderate rubra without clots, no purulent discharge, no foul smelling lochia  Outcome: Ongoing  Goal: Empties bladder  Outcome: Ongoing  Goal: Verbalizes understanding of normal bowel function resumption  Outcome: Ongoing  Goal: Edema will be absent or minimal  Outcome: Ongoing  Goal: Breasts are soft with nipple integrity intact  Outcome: Ongoing  Goal: Demonstrates appropriate breast feeding techniques  Outcome: Ongoing  Goal: Appropriate behavior observed  Outcome: Ongoing  Goal: Positive Mother-Baby interactions are observed  Outcome: Ongoing  Goal: Perineum intact without discharge or hematoma  Outcome: Ongoing  Goal: Ambulates independently  Outcome: Ongoing     Problem: PAIN  Goal: Patient's pain/discomfort is manageable  Outcome: Ongoing     Problem: KNOWLEDGE DEFICIT  Goal: Patient/S.O. demonstrates understanding of disease process, treatment plan, medications, and discharge instructions.   Outcome: Ongoing

## 2021-07-25 NOTE — PROGRESS NOTES
[facility], [facility] pt requesting epidural  7587 - CRNA at bedside at this time for epidural placement  1012 - Epidural test dose given at this time  1023 - CRNA finished placement and securement.

## 2021-07-25 NOTE — FLOWSHEET NOTE
Pt's legs assessed for strength and mobility after receiving spinal/epidural analgesia. Able to stand and ambulate with two person assist to bathroom without complication. Teaching done about normal vaginal bleeding. Instructed to report if she is saturating a pad an hour or passing large blood clots. Gucci care teaching done regarding gucci bottle, ice pack, witch hazel pads and dermoplast use. Able to void 300 mL at this time. Assisted back to bed. Encouraged to call for nurse help when getting OOB next time. Verbal understanding stated.

## 2021-07-25 NOTE — ANESTHESIA PROCEDURE NOTES
Epidural Block    Patient location during procedure: OB  Start time: 7/25/2021 10:02 AM  End time: 7/25/2021 10:23 AM  Reason for block: labor epidural  Staffing  Performed: resident/CRNA   Resident/CRNA: FERCHO Brothers CRNA  Preanesthetic Checklist  Completed: patient identified, IV checked, site marked, risks and benefits discussed, surgical consent, monitors and equipment checked, pre-op evaluation, timeout performed, anesthesia consent given, oxygen available and patient being monitored  Epidural  Patient position: sitting  Prep: Betadine  Patient monitoring: continuous pulse ox and frequent blood pressure checks  Approach: midline  Location: lumbar (1-5) (L3-4)  Injection technique: KALINA saline  Provider prep: mask and sterile gloves  Needle  Needle type: Tuohy   Needle gauge: 17 G  Needle length: 3.5 in  Catheter type: side hole  Catheter size: 19 G (20 G)  Catheter at skin depth: 11 cm  Test dose: negative  Assessment  Sensory level: T8  Hemodynamics: stable  Attempts: 1  Additional Notes  Sitting, Sterile prep/drape, 1%Xylo at L3-4, 17ga Tuohy with KALINA, 25ga Pencan for w/+CSF for DPE, Pencan removed, Catheter inserted, negative test dose, sterile dressing applied.

## 2021-07-25 NOTE — PROCEDURES
Department of Obstetrics and Gynecology  Spontaneous Vaginal Delivery Note    Labor & Delivery Summary  Dilation Complete Date: 21  Dilation Complete Time: 5684        Procedure:  Spontaneous vaginal delivery    Surgeon:   Ezra Maki CNM    Information for the patient's :  Erich Dozier [[id number]]          Anesthesia:  epidural anesthesia    Estimated blood loss:  100ml    Specimen:  Placenta not sent to pathology     Cord blood sent No    Complications:  none    Condition:  infant stable to general nursery and mother stable    Details of Procedure: The patient is a 38 y.o. female at 44w2d   OB History        4    Para   2    Term   2       0    AB   1    Living   2       SAB   1    TAB   0    Ectopic   0    Molar   0    Multiple   0    Live Births   2             who was admitted for active phase labor. She received the following interventions: IV Pitocin augmentation She was known to be GBS negative and did not receive antibiotic prophylaxis. The patient progressed well,did receive an epidural, became complete and started to push. After pushing for 5 min the fetal head was at the perineum, delivered OA over intact perineum, NC x 1 noted and reduced and the rest of the liveborn male infant delivered atraumatically at [phone] 21, placed on mother abdomen. Cord was clamped and cut. NB was stimulated with lusty cry noted. No resuscitation measures were needed. Apgars 8/9. Wt   Lb,    Grams. The delivery of the placenta was spontaneous. The perineum and vagina were explored and a no lacerations were noted. FF@ U-2 with IV pitocin. Mom plans on breast feeding. Mom and baby are stable in the immediate postpartum period. Dr. Eric Gray notified of this delivery.

## 2021-07-25 NOTE — PROGRESS NOTES
This RN at bedside. Pt ids requesting for IV to be saline locked and to hydrate with oral fluids. Also requesting to have intermittent EFM.

## 2021-07-25 NOTE — PROGRESS NOTES
Pt actively pushing. RN and Aye Garciat remains in continuous attendance at the bedside assessing fetal heart rate per EFM .

## 2021-07-26 VITALS
DIASTOLIC BLOOD PRESSURE: 68 MMHG | HEART RATE: 63 BPM | HEIGHT: 69 IN | BODY MASS INDEX: 28.14 KG/M2 | WEIGHT: 190 LBS | SYSTOLIC BLOOD PRESSURE: 107 MMHG | TEMPERATURE: 98.1 F | RESPIRATION RATE: 16 BRPM | OXYGEN SATURATION: 97 %

## 2021-07-26 LAB
HCT VFR BLD CALC: 35.2 % (ref 36–48)
HEMOGLOBIN: 12.4 G/DL (ref 12–16)
MCH RBC QN AUTO: 31.6 PG (ref 26–34)
MCHC RBC AUTO-ENTMCNC: 35.1 G/DL (ref 31–36)
MCV RBC AUTO: 89.9 FL (ref 80–100)
PDW BLD-RTO: 13.3 % (ref 12.4–15.4)
PLATELET # BLD: 134 K/UL (ref 135–450)
PMV BLD AUTO: 9.8 FL (ref 5–10.5)
RBC # BLD: 3.91 M/UL (ref 4–5.2)
TOTAL SYPHILLIS IGG/IGM: NORMAL
WBC # BLD: 13.5 K/UL (ref 4–11)

## 2021-07-26 PROCEDURE — 85027 COMPLETE CBC AUTOMATED: CPT

## 2021-07-26 PROCEDURE — 6370000000 HC RX 637 (ALT 250 FOR IP): Performed by: OBSTETRICS & GYNECOLOGY

## 2021-07-26 RX ORDER — IBUPROFEN 800 MG/1
800 TABLET ORAL EVERY 6 HOURS PRN
Qty: 120 TABLET | Refills: 3 | Status: SHIPPED | OUTPATIENT
Start: 2021-07-26

## 2021-07-26 RX ADMIN — ACETAMINOPHEN 650 MG: 325 TABLET ORAL at 06:51

## 2021-07-26 RX ADMIN — IBUPROFEN 800 MG: 800 TABLET, FILM COATED ORAL at 04:09

## 2021-07-26 RX ADMIN — DOCUSATE SODIUM 100 MG: 100 CAPSULE ORAL at 08:01

## 2021-07-26 RX ADMIN — ACETAMINOPHEN 650 MG: 325 TABLET ORAL at 11:46

## 2021-07-26 RX ADMIN — ACETAMINOPHEN 650 MG: 325 TABLET ORAL at 00:41

## 2021-07-26 RX ADMIN — IBUPROFEN 800 MG: 800 TABLET, FILM COATED ORAL at 11:46

## 2021-07-26 ASSESSMENT — PAIN SCALES - GENERAL
PAINLEVEL_OUTOF10: 3
PAINLEVEL_OUTOF10: 1
PAINLEVEL_OUTOF10: 3
PAINLEVEL_OUTOF10: 2

## 2021-07-26 NOTE — L&D DELIVERY SUMMARY NOTE
[address] [address]                            LABOR AND DELIVERY NOTE    PATIENT NAME: Christina Rodgers                        :        1985  MED REC NO:   [id number]                          ROOM:       1740  ACCOUNT NO:   [de-identified]                           ADMIT DATE: 2021  PROVIDER:     Mickey Lu CNM    DATE OF PROCEDURE:  2021    DELIVERY SUMMARY    The patient is a 39-year-old  5, para 2-0-2-2 at 39.2 weeks  gestation that presented to Labor and Delivery at approximately 07:00,  after spontaneous rupture of membranes at home at 04:00 with clear fluid  noted. Initially she did not have contraction, however, over the course  of a couple of hours, she did began arely and came on into the  hospital, when she got here she was 3 cm, 70%, -3 station and posterior. She had been about to 2 cm in the office. Therefore, she was admitted  with routine labor orders. Her group B strep screen was negative. Her  prenatal course was otherwise uncomplicated. Fetal heart tones were  category I and remained that way throughout labor. She walked around  for a while to stimulate contractions. She used the breast pump, which  did start contractions, became uncomfortable, and requested an epidural  which she received with good relief. After which time contraction  spaced again and Pitocin augmentation was started to a maximum of two  milliunits. Cervical exam was done after epidural, she was noted to 4  to 5 cm. Artificial rupture of membranes of the forebag for a very  small amount of clear fluid was noted at that time, she then progressed  uneventfully to complete and was positioned in stirrups, instructed on  pushing, after pushing for approximately 5 minutes, the fetal head was  at the perineum, delivered OA over an intact perineum.   Nuchal cord x1  was noted and easily reduced and the rest of the liveborn male infant  delivered atraumatically at 13:47 and was placed on mom's abdomen. Whitmer was dried, stimulated with lusty cry noted. No resuscitation  measures were needed. Apgars of 8 and 9. Cord was doubly clamped and  cut by dad after 5 minutes of delayed cord clamping. Delivery of the  placenta was spontaneous, it was inspected and appeared to be intact  with three-vessel cord noted. The perineum and vagina were then  explored and no lacerations were noted. Fundus was firm at U-2 with IV  Pitocin and fundal massage. Quantitative blood loss of 100 mL. Weight  pending at the time of this dictation. Mom plans on breast-feeding. Mom and baby are stable in the immediate postpartum period and Dr. Anuj Cruz will be notified of this delivery.         Basilio Michaels CNM    D: 2021 14:34:25       T: 2021 14:39:28     [email or web address]  Job#: [id number]     Doc#: [id number]    CC:

## 2021-07-26 NOTE — PLAN OF CARE
Problem: VAGINAL DELIVERY - RECOVERY AND POST PARTUM  Goal: Vital signs are medically acceptable  7/25/2021 2110 by Tara Mckinney RN  Outcome: Ongoing  7/25/2021 1416 by Priya Fischer RN  Outcome: Ongoing  Goal: Patient will remain free of falls  7/25/2021 2110 by Tara Mckinney RN  Outcome: Ongoing  7/25/2021 1416 by Priya Fischer RN  Outcome: Ongoing  Goal: Fundus firm at midline  7/25/2021 2110 by Tara Mckinney RN  Outcome: Ongoing  7/25/2021 1416 by Priya Fischer RN  Outcome: Ongoing  Goal: Moderate rubra without clots, no purulent discharge, no foul smelling lochia  7/25/2021 2110 by Tara Mckinney RN  Outcome: Ongoing  7/25/2021 1416 by Priya Fischer RN  Outcome: Ongoing  Goal: Empties bladder  7/25/2021 2110 by Tara Mckinney RN  Outcome: Ongoing  7/25/2021 1416 by Priya Fischer RN  Outcome: Ongoing  Goal: Verbalizes understanding of normal bowel function resumption  7/25/2021 2110 by Tara Mckinney RN  Outcome: Ongoing  7/25/2021 1416 by Priya Fischer RN  Outcome: Ongoing  Goal: Edema will be absent or minimal  7/25/2021 2110 by Tara Mckinney RN  Outcome: Ongoing  7/25/2021 1416 by Priya Fischer RN  Outcome: Ongoing  Goal: Breasts are soft with nipple integrity intact  7/25/2021 2110 by Tara Mckinney RN  Outcome: Ongoing  7/25/2021 1416 by Priya Fischer RN  Outcome: Ongoing  Goal: Demonstrates appropriate breast feeding techniques  7/25/2021 2110 by Tara Mckinney RN  Outcome: Ongoing  7/25/2021 1416 by Priya Fischer RN  Outcome: Ongoing  Goal: Appropriate behavior observed  7/25/2021 2110 by Tara Mckinney RN  Outcome: Ongoing  7/25/2021 1416 by Priya Fischer RN  Outcome: Ongoing  Goal: Positive Mother-Baby interactions are observed  7/25/2021 2110 by Tara Mckinney RN  Outcome: Ongoing  7/25/2021 1416 by Priya Fischer RN  Outcome: Ongoing  Goal: Perineum intact without discharge or hematoma  7/25/2021 2110 by Jose Burnett RN  Outcome: Ongoing  7/25/2021 1416 by Mihaela Hammond RN  Outcome: Ongoing  Goal: Ambulates independently  7/25/2021 2110 by Jose Burnett RN  Outcome: Ongoing  7/25/2021 1416 by Mihaela Hammond RN  Outcome: Ongoing     Problem: PAIN  Goal: Patient's pain/discomfort is manageable  7/25/2021 2110 by Jose Burnett RN  Outcome: Ongoing  7/25/2021 1416 by Mihaela Hammond RN  Outcome: Ongoing     Problem: KNOWLEDGE DEFICIT  Goal: Patient/S.O. demonstrates understanding of disease process, treatment plan, medications, and discharge instructions.   7/25/2021 2110 by Jose Burnett RN  Outcome: Ongoing  7/25/2021 1416 by Mihaela Hammond RN  Outcome: Ongoing

## 2021-07-26 NOTE — DISCHARGE SUMMARY
Obstetrical Discharge Form    Gestational Age: 44w2d    Antepartum complications: none    Date of Delivery: 21      Type of Delivery: vaginal, spontaneous    Delivered By: [place] [facility]     Baby:      Information for the patient's :  Aleyda Salazar [[id number]]   APGAR One: 8     Information for the patient's :  Aleyda Salazar [[id number]]   APGAR Five: 9     Information for the patient's :  Aleyda Salazar [[id number]]   Birth Weight: 7 lb 15 oz (3.599 kg)       Anesthesia: Epidural    Intrapartum complications: None    Postpartum complications: none    Discharge Medication:    Cynthia, [address] [place] [address] Medication Instructions RCE:[id number]    Printed on:21 2547   Medication Information                      diphenhydrAMINE-APAP, sleep, (TYLENOL PM EXTRA STRENGTH)  MG tablet  Take 2 tablets by mouth nightly as needed for Sleep             ibuprofen (ADVIL;MOTRIN) 800 MG tablet  Take 1 tablet by mouth every 6 hours as needed (Pain level 1 - 10)             Prenatal Multivit-Min-Fe-FA (PRE-ANDREIA PO)  Take by mouth                  Discharge Condition:  good    Discharge Date: 21    PLAN:  Follow up in 6 weeks for routine PP visit  All questions answered  D/C summary begun at delivery for D/C planning purposes, any delay in discharge from ordered D/C date due to  factors.

## 2021-07-26 NOTE — FLOWSHEET NOTE
Pt transported in wheelchair to exit with her infant secured by parents in their car seat on her lap

## 2021-07-26 NOTE — PROGRESS NOTES
Department of Obstetrics and Gynecology  Labor and Delivery  Attending Post Partum Progress Note      SUBJECTIVE:  Feeling well, ambulating w/out problems. Voiding and bleeding WNL. Pain well controlled w/PO Motrin. Breastfeeding infant male. Desires circ.     OBJECTIVE:      Vitals:  /68   Pulse 63   Temp 98.1 °F (36.7 °C)   Resp 16   Ht 5' 9\" (1.753 m)   Wt 190 lb (86.2 kg)   SpO2 97%   Breastfeeding Unknown   BMI 28.06 kg/m²         DATA:    CBC:    Lab Results   Component Value Date    WBC 13.5 07/26/2021    RBC 3.91 07/26/2021    HGB 12.4 07/26/2021    HCT 35.2 07/26/2021    MCV 89.9 07/26/2021    RDW 13.3 07/26/2021     07/26/2021       ASSESSMENT & PLAN:      Normal PP Day 2  Lactating  Stable infant male    D/C instructions and warnings given  Rx for Motrin  Office F/U in 6wks  D/C home today

## 2021-07-26 NOTE — PLAN OF CARE
Problem: VAGINAL DELIVERY - RECOVERY AND POST PARTUM  Goal: Vital signs are medically acceptable  Outcome: Completed  Goal: Patient will remain free of falls  Outcome: Completed  Goal: Fundus firm at midline  Outcome: Completed  Goal: Moderate rubra without clots, no purulent discharge, no foul smelling lochia  Outcome: Completed  Goal: Empties bladder  Outcome: Completed  Goal: Verbalizes understanding of normal bowel function resumption  Outcome: Completed  Goal: Edema will be absent or minimal  Outcome: Completed  Goal: Breasts are soft with nipple integrity intact  Outcome: Completed  Goal: Demonstrates appropriate breast feeding techniques  Outcome: Completed  Goal: Appropriate behavior observed  Outcome: Completed  Goal: Positive Mother-Baby interactions are observed  Outcome: Completed  Goal: Perineum intact without discharge or hematoma  Outcome: Completed  Goal: Ambulates independently  Outcome: Completed

## 2021-07-26 NOTE — ANESTHESIA POSTPROCEDURE EVALUATION
Department of Anesthesiology  Postprocedure Note    Patient: Chandler Sellers  MRN: [id number]  YOB: 1985  Date of evaluation: 7/26/2021  Time:  11:09 AM     Procedure Summary     Date: 07/25/21 Room / Location:     Anesthesia Start: 1002 Anesthesia Stop: 1347    Procedure: Labor Analgesia Diagnosis:     Scheduled Providers:  Responsible Provider: Denise Akers MD    Anesthesia Type: epidural ASA Status: 2 - Emergent          Anesthesia Type: epidural    Jayro Phase I: Jayro Score: 9    Jayro Phase II: Jayro Score: 9    Last vitals: Reviewed and per EMR flowsheets.        Anesthesia Post Evaluation    Patient location during evaluation: bedside  Patient participation: complete - patient participated  Level of consciousness: awake and alert  Nausea & Vomiting: no nausea and no vomiting  Complications: no  Cardiovascular status: hemodynamically stable  Hydration status: stable

## 2021-07-26 NOTE — FLOWSHEET NOTE
Discharge instructions for mom and baby reviewed in detail with this patient. She voiced a good understanding of same

## 2021-07-26 NOTE — LACTATION NOTE
Lactation Progress Note      Data:   F/U on multip breast feeder who is hoping to be dc home today. Mob states that baby is feeding well. Mob states that she has a history of over supply and plugged ducts. Output and wt loss are WNL. Action:  Education provided regarding plugged ducts and over supply. Discharge teaching done; what to expect in the first few days of life, to feed baby at first sign of hunger cue for total of 8-12 times per day after the first DOL, how to properly position and latch baby, how to know baby is getting enough, engorgement prevention and treatment, avoiding bottles and pacifiers and community resources. Encouraged to call [de-identified] or [facility] clinic for f/u prn. Response: Verbalized understanding and comfortable with breast feeding for d/c.

## 2021-07-26 NOTE — FLOWSHEET NOTE
Discharge Phone Call Log  Patient Name: Gautam Mills Records Care Provider: Nadai Robbins MD Discharge Date: 2021    Disposition of baby:    Phone Number: [phone] (home)     Attempts to Contact:  Date:    Nurse  Date:    Nurse  Date:    Nurse    1. Now that you are at home is your pain being well controlled? Y/N   What pain reducing measures are you using? ____________________________________        Information for the patient's :  Caro Vega [[id number]]   Delivery Method: Vaginal, Spontaneous     2. Are you currently  having any infant feeding issues? Y/N _____________________________ If yes, please explain: __________________________________________________________________  3. If breastfeeding, were you satisfied with the breastfeeding support services offered? Y/N  4.  Have you had to supplement? Y/N If yes, please explain: _____________________________________________________       Did you supplement while in the hospital, or begin formula supplementation at home?________________________________________  5. Did your OB provider offer you information about the benefits of breastfeeding during your prenatal visits? Y/N  6.  Have you made or have you already had your first appointment with the baby's doctor? Y/N If no, do you know when to schedule it? Y/N   7.  Have you scheduled your follow-up appointment? Y/N  If no, do you know when to schedule it? Y/N  8. Did staff discuss safe sleep during your stay? Y/N  Did you see the wall cling posted in your room explaining how to keep you and your baby safe? Y/N  10. Did your nurses and physicians include you in the plan of care, communicating with you respectfully? Y/N If no, please explain __________________________  11. Is there anyone in particular you would like to mention who provided care for you? ________________________________  12. Did your discharge occur in a timely manner?   Y/N If no, please explain __________________________  13. Do you have any other questions or concerns I can address today?  Y/N  __________________________________________________      Teaching During interview :_____________________________________________  ___________________________RN       Date:______________Time:________________

## 2021-09-08 NOTE — PROGRESS NOTES
RKleeCNM updated with patient arrival, SVE and fluid noted. Orders for admission received.  Patient may have epidural on request. Detail Level: Simple Sunscreen Recommendations: Spf 30 or 50 Detail Level: Generalized Sunscreen Recommendations: SPF with zinc 30- 48 Detail Level: Zone Sunscreen Recommendations: Spf 30 to 50 Skin Checks Recommendations: Watch for any abnormal moles.